# Patient Record
Sex: MALE | Race: WHITE | NOT HISPANIC OR LATINO
[De-identification: names, ages, dates, MRNs, and addresses within clinical notes are randomized per-mention and may not be internally consistent; named-entity substitution may affect disease eponyms.]

---

## 2020-04-27 ENCOUNTER — TRANSCRIPTION ENCOUNTER (OUTPATIENT)
Age: 60
End: 2020-04-27

## 2022-08-23 PROBLEM — Z00.00 ENCOUNTER FOR PREVENTIVE HEALTH EXAMINATION: Status: ACTIVE | Noted: 2022-08-23

## 2022-08-30 ENCOUNTER — APPOINTMENT (OUTPATIENT)
Dept: ORTHOPEDIC SURGERY | Facility: CLINIC | Age: 62
End: 2022-08-30

## 2022-08-30 VITALS — HEIGHT: 68 IN | BODY MASS INDEX: 28.49 KG/M2 | WEIGHT: 188 LBS

## 2022-08-30 DIAGNOSIS — E78.00 PURE HYPERCHOLESTEROLEMIA, UNSPECIFIED: ICD-10-CM

## 2022-08-30 PROCEDURE — 73030 X-RAY EXAM OF SHOULDER: CPT | Mod: LT

## 2022-08-30 PROCEDURE — 73010 X-RAY EXAM OF SHOULDER BLADE: CPT | Mod: LT

## 2022-08-30 RX ORDER — DICLOFENAC SODIUM 75 MG/1
75 TABLET, DELAYED RELEASE ORAL TWICE DAILY
Qty: 60 | Refills: 3 | Status: ACTIVE | COMMUNITY
Start: 2022-08-30 | End: 1900-01-01

## 2022-08-30 NOTE — ASSESSMENT
[FreeTextEntry1] : L RC tendonitis/impingement.\par Activity modification.\par Ice.\par Trial of PT.\par Diclofenac BID prn.\par Consider SA injection.\par RTO 6 weeks.\par

## 2022-08-30 NOTE — PHYSICAL EXAM
[5 ___] : forward flexion 5[unfilled]/5 [5___] : internal rotation 5[unfilled]/5 [Left] : left shoulder [Type 2 acromion] : Type 2 acromion [There are no fractures, subluxations or dislocations. No significant abnormalities are seen] : There are no fractures, subluxations or dislocations. No significant abnormalities are seen [] : no erythema [FreeTextEntry9] : \par ER 60

## 2022-08-30 NOTE — HISTORY OF PRESENT ILLNESS
[Sudden] : sudden [Localized] : localized [de-identified] : 8/30/22: Patient is a 60 yo LHD male c/o left shoulder pain for 6 months with no specific injury.  He did notice some paina bout one year ago doing pushups.  No n/t. Not taking any medication for pain. Pain is worse with reaching out to side. No previous injuries or surgeries to left shoulder.\par Occupation: Retired [employer] -   [] : no [FreeTextEntry1] : lt shoulder [FreeTextEntry3] : few months ago [FreeTextEntry5] : Patient reports feeling some pain when doing a push up. Now has pain when lifting and movement.

## 2022-10-20 ENCOUNTER — APPOINTMENT (OUTPATIENT)
Dept: ORTHOPEDIC SURGERY | Facility: CLINIC | Age: 62
End: 2022-10-20

## 2022-10-20 VITALS — WEIGHT: 188 LBS | HEIGHT: 68 IN | BODY MASS INDEX: 28.49 KG/M2

## 2022-10-20 DIAGNOSIS — M75.52 BURSITIS OF LEFT SHOULDER: ICD-10-CM

## 2022-10-20 DIAGNOSIS — M75.42 IMPINGEMENT SYNDROME OF LEFT SHOULDER: ICD-10-CM

## 2022-10-20 PROCEDURE — 99213 OFFICE O/P EST LOW 20 MIN: CPT

## 2022-10-20 NOTE — PHYSICAL EXAM
[Left] : left shoulder [5 ___] : forward flexion 5[unfilled]/5 [5___] : internal rotation 5[unfilled]/5 [There are no fractures, subluxations or dislocations. No significant abnormalities are seen] : There are no fractures, subluxations or dislocations. No significant abnormalities are seen [] : pain with abduction [FreeTextEntry9] : \par ER 60

## 2022-10-20 NOTE — ASSESSMENT
[FreeTextEntry1] : L RC tendonitis/impingement.\par Activity modification.\par Ice.\par He tried PT with improvement.  He wqill continue. \par Diclofenac BID prn.\par Consider SA injection vs MRI if not improved. \par RTO 6 weeks.\par

## 2022-10-20 NOTE — HISTORY OF PRESENT ILLNESS
[Sudden] : sudden [Localized] : localized [8] : 8 [0] : 0 [Retired] : Work status: retired [de-identified] : 10/20/22: Here for follow up.   He is in PT with improvement.  He feels 50-60% improved.  He has not taken the Diclofenac. \par \par 8/30/22: Patient is a 62 yo LHD male c/o left shoulder pain for 6 months with no specific injury.  He did notice some paina bout one year ago doing pushups.  No n/t. Not taking any medication for pain. Pain is worse with reaching out to side. No previous injuries or surgeries to left shoulder.\par \par Occupation: Retired [employer] -   [] : no [FreeTextEntry1] : lt shoulder [FreeTextEntry5] : Patient reports feeling some pain when doing a push up. Now has pain when lifting and movement. [de-identified] : 8/31/22 [de-identified] : 10/20/22 [de-identified] : PT, HEP